# Patient Record
Sex: MALE | Race: WHITE | NOT HISPANIC OR LATINO | URBAN - METROPOLITAN AREA
[De-identification: names, ages, dates, MRNs, and addresses within clinical notes are randomized per-mention and may not be internally consistent; named-entity substitution may affect disease eponyms.]

---

## 2024-09-17 ENCOUNTER — OFFICE VISIT (OUTPATIENT)
Dept: UROLOGY | Facility: CLINIC | Age: 49
End: 2024-09-17
Attending: SPECIALIST

## 2024-09-17 VITALS
WEIGHT: 213.88 LBS | SYSTOLIC BLOOD PRESSURE: 110 MMHG | BODY MASS INDEX: 28.97 KG/M2 | HEIGHT: 72 IN | DIASTOLIC BLOOD PRESSURE: 74 MMHG

## 2024-09-17 DIAGNOSIS — R30.0 DYSURIA: Primary | ICD-10-CM

## 2024-09-17 LAB
BILIRUB SERPL-MCNC: NORMAL MG/DL
BLOOD URINE, POC: NORMAL
CLARITY, POC UA: CLEAR
COLOR, POC UA: YELLOW
GLUCOSE UR QL STRIP: NORMAL
KETONES UR QL STRIP: NORMAL
LEUKOCYTE ESTERASE URINE, POC: NORMAL
NITRITE, POC UA: NORMAL
PH, POC UA: 5.5
PROTEIN, POC: NORMAL
SPECIFIC GRAVITY, POC UA: 1.02
UROBILINOGEN, POC UA: 0.2

## 2024-09-17 PROCEDURE — 81002 URINALYSIS NONAUTO W/O SCOPE: CPT | Mod: PBBFAC | Performed by: SPECIALIST

## 2024-09-17 PROCEDURE — 99205 OFFICE O/P NEW HI 60 MIN: CPT | Mod: S$PBB,,, | Performed by: SPECIALIST

## 2024-09-17 PROCEDURE — 99999PBSHW POCT URINE DIPSTICK WITHOUT MICROSCOPE: Mod: PBBFAC,,,

## 2024-09-17 PROCEDURE — 99212 OFFICE O/P EST SF 10 MIN: CPT | Mod: PBBFAC | Performed by: SPECIALIST

## 2024-09-17 PROCEDURE — 87086 URINE CULTURE/COLONY COUNT: CPT | Performed by: SPECIALIST

## 2024-09-17 PROCEDURE — 99999 PR PBB SHADOW E&M-EST. PATIENT-LVL II: CPT | Mod: PBBFAC,,, | Performed by: SPECIALIST

## 2024-09-17 RX ORDER — LEVOFLOXACIN 500 MG/1
500 TABLET, FILM COATED ORAL DAILY
Qty: 14 TABLET | Refills: 0 | Status: SHIPPED | OUTPATIENT
Start: 2024-09-17

## 2024-09-17 RX ORDER — TAMSULOSIN HYDROCHLORIDE 0.4 MG/1
1 CAPSULE ORAL NIGHTLY
COMMUNITY
Start: 2024-09-06

## 2024-09-17 RX ORDER — MAG HYDROX/ALUMINUM HYD/SIMETH 400-400-40
SUSPENSION, ORAL (FINAL DOSE FORM) ORAL
COMMUNITY

## 2024-09-17 RX ORDER — LYCOPENE 10 MG
CAPSULE ORAL
COMMUNITY

## 2024-09-17 NOTE — PROGRESS NOTES
[facility] - Urology   Clinic Note    SUBJECTIVE:     Chief Complaint   Patient presents with    Dysuria    Testicle Pain     States he first noticed the pain a few months ago, has some pain at the moment.       Referral from: Self, Anyial.    History of Present Illness:  Jorge L Capellan is a 49 y.o. male who presents to clinic for dysuria..    Patient is a very pleasant 49-year-old gentleman self-referred for irritative voiding symptoms.  His symptoms started roughly 2-3 months ago.  He is originally from [place] and saw a urologist there as well as [facility].  In summary he experiences excruciating pain when voiding, particularly at nighttime.  He states that he is monogamous with his wife and does not have a history of STDs.  He denies urethral discharge or pyuria.  He had been empirically treated with two 10 day courses of ciprofloxacin.  He mentions he was empirically treated for prostatitis and or possible epididymitis.  His symptoms abated for a brief period of time.  He has been placed on tamsulosin and was encouraged to increase the dose to 0.8 mg.  Both 0.4 mg and 0.8 mg have not provided much relief.  Until a diagnosis is made for his symptoms I suggested continuing 0.4 mg OD.  He is also taking numerous homeopathic remedies ranging from lycopene an herbal supplements.  I can not comment regarding the efficacy based on the absence of controlled studies.  In addition, he had a scrotal ultrasound performed in [place].  I do not have a copy of the report.  He mentioned he was likely diagnosed with epididymitis on ultrasound, otherwise findings unremarkable.  Otherwise he denies hematuria pyuria flank pain, history of urolithiasis, urinary retention, fever or chills, no history of urethral stricture disease.    Patient endorses no additional complaints at this time.    History reviewed. No pertinent past medical history.    History reviewed. No pertinent surgical history.    No family  "history on file.    Social History     Tobacco Use    Smoking status: Former     Types: Cigarettes    Smokeless tobacco: Never       Current Outpatient Medications on File Prior to Visit   Medication Sig Dispense Refill    lycopene 10 mg Cap Take by mouth.      saw palmetto 450 mg Cap Take by mouth.      tamsulosin (FLOMAX) 0.4 mg Cap Take 1 capsule by mouth every evening.      zinc acetate 50 mg (zinc) Cap Take 50 mg by mouth once daily.       No current facility-administered medications on file prior to visit.       Review of patient's allergies indicates:   Allergen Reactions    Iodine Other (See Comments)       Review of Systems   Constitutional:  Negative for chills and weight loss.   Genitourinary:  Positive for dysuria.        Review of Systems:  A review of 10+ systems was conducted with pertinent positive and negative findings documented in HPI with all other systems reviewed and negative.    OBJECTIVE:     Estimated body mass index is 29 kg/m² as calculated from the following:    Height as of this encounter: 6' (1.829 m).    Weight as of this encounter: 97 kg (213 lb 13.5 oz).    Vital Signs (Most Recent)  Vitals:    09/17/24 1106   BP: 110/74       Physical Exam:    Physical Exam     GENERAL: patient sitting comfortably  HEENT: normocephalic  NECK: supple, no JVD  PULM: normal chest rise, no increased WOB  HEART: non-diaphoretic  ABDO: soft, nondistended, nontender  BACK: no CVA tenderness bilaterally  SKIN: warm, dry, well perfused  EXT: no bruising or edema  NEURO: grossly normal with no focal deficits  PSYCH: appropriate mood and affect    Genitourinary Exam:  Both testes descended normal size nontender no masses no fullness or tenderness of left or right epididymis, normal circumcised male phallus, no lesions, prostate 30 g smooth nontender normal consistency no nodules      LABS:     No results found for: "BUN", "CREATININE", "GFRNONAA", "GFRAA", "WBC", "HGB", "HCT", "PLT", "AST", "ALT", "ALKPHOS", " ""ALBUMIN", "HGBA1C", "PT", "PTT", "INR"     Urinalysis:   No results found for: "UAREFLEX"     PSA:  No results found for: "PSA", "PSADIAG", "PSATOTAL", "PSAFREE"    Testosterone:  No results found for: "TOTALTESTOST", "TESTOSTERONE"     Imaging:  I have personally reviewed all relevant imaging studies.    No results found for this or any previous visit (from the past 2160 hour(s)).  No results found for this or any previous visit (from the past 2160 hour(s)).  No image results found.         ASSESSMENT     1. Dysuria        PLAN:     Differential diagnosis includes but not limited to chronic prostatitis, acute cystitis, interstitial cystitis, urethral stricture, bladder stones, or possible neoplasm.  I have encouraged patient to remain on tamsulosin 0.4 mg a day.  I will empirically treat for chronic prostatitis with another round of a quinolone-Levaquin 500 mg daily x2 weeks.  I have encouraged him to obtain copies of his medical records including his scrotal ultrasound report.  Finally, I would like to proceed with cystoscopy to exclude a urethral stricture, bladder stone or neoplasm.  All questions were answered.    Guanakito Irwin MD  Urology  [facility]     Disclaimer: This note has been generated using voice-recognition software. There may be typographical errors that have been missed during proof-reading.     "

## 2024-09-18 LAB — BACTERIA UR CULT: NO GROWTH

## 2024-09-20 ENCOUNTER — TELEPHONE (OUTPATIENT)
Dept: UROLOGY | Facility: CLINIC | Age: 49
End: 2024-09-20

## 2024-09-20 DIAGNOSIS — R30.0 DYSURIA: Primary | ICD-10-CM

## 2024-09-20 NOTE — TELEPHONE ENCOUNTER
Called patient back per Dr. Irwin to see if we could move his appointment from this morning to this afternoon for more time to discuss issues. States he won't be able to make it in today due to work and would just like for Dr. Irwin to call him to discuss. Notified him that I would forward that over to Dr. Irwin to see if he would reach out to him. V/u. Canceled appointment for today.

## 2024-09-30 ENCOUNTER — TELEPHONE (OUTPATIENT)
Dept: UROLOGY | Facility: CLINIC | Age: 49
End: 2024-09-30

## 2024-09-30 NOTE — TELEPHONE ENCOUNTER
----- Message from Zulema sent at 2024  8:15 AM CDT -----  Contact: PATIENT  Jorge L Capellan  MRN: [id number]  : 1975  PCP: Nikia, Primary Doctor  Home Phone      [phone]  Work Phone      Not on file.  Mobile          [phone]      MESSAGE: Patient states that he received a message regarding a procedure that he has on 10/01/24.  The only thing that is shows is an office visit on 10/01/24.  He says that it is asking him to pay but he would like to know what he is paying for.          Phone: [phone]

## 2024-09-30 NOTE — TELEPHONE ENCOUNTER
Tried calling patient back in regards to his procedure tomorrow. Unable to reach, lvm to return call to clinic.

## 2024-10-01 ENCOUNTER — PROCEDURE VISIT (OUTPATIENT)
Dept: UROLOGY | Facility: CLINIC | Age: 49
End: 2024-10-01
Attending: SPECIALIST

## 2024-10-01 DIAGNOSIS — R30.0 DYSURIA: Primary | ICD-10-CM

## 2024-10-01 PROCEDURE — 52000 CYSTOURETHROSCOPY: CPT | Mod: PBBFAC | Performed by: SPECIALIST

## 2024-10-01 RX ORDER — HYDROXYZINE HYDROCHLORIDE 25 MG/1
25 TABLET, FILM COATED ORAL NIGHTLY
Qty: 90 TABLET | Refills: 5 | Status: SHIPPED | OUTPATIENT
Start: 2024-10-01

## 2024-10-01 RX ORDER — AMITRIPTYLINE HYDROCHLORIDE 25 MG/1
25 TABLET, FILM COATED ORAL NIGHTLY PRN
Qty: 90 TABLET | Refills: 3 | Status: SHIPPED | OUTPATIENT
Start: 2024-10-01 | End: 2025-10-01

## 2024-10-01 NOTE — PROCEDURES
Procedure cystoscopy    Patient was taken to the procedure room and placed on the examining table in a supine position.  His genitalia was prepped and draped in usual sterile fashion.  Informed consent was obtained in writing.  Flexible cystourethroscopy was performed.  There was no evidence of any urethral strictures or erythema.  Upon entering the prostate patient was in excruciating pain.  I did not get a good look of the bladder.  I terminated the procedure.    Assessment and plan:     Patient states that he has been experiencing extreme bladder and perineal pain for 2-3 months.  It has been unbearable for him.  Due to his pain and anxiety he tends to refrain from voiding and squeeze his pelvic floor muscles.  Likewise, he is afraid to develop urinary retention.  He does have some modest relief width tamsulosin however we agreed to try another alpha-blocker.  In summary, I explained that the differential diagnosis includes but not limited to prostatitis, urethritis, interstitial cystitis or chronic pelvic pain syndrome, or urothelial neoplasm.  At this point in time we will empirically treat for interstitial cystitis.  I will prescribe a cocktail of Elmiron Atarax and amitriptyline.  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  We believe the benefits outweigh the risks.  Patient will follow up in roughly 6 weeks.

## 2024-10-14 ENCOUNTER — TELEPHONE (OUTPATIENT)
Dept: UROLOGY | Facility: CLINIC | Age: 49
End: 2024-10-14

## 2024-10-14 DIAGNOSIS — R30.0 DYSURIA: Primary | ICD-10-CM

## 2024-10-14 NOTE — TELEPHONE ENCOUNTER
"----- Message from Guanakito Irwin MD sent at 10/11/2024  5:25 PM CDT -----  Regarding: RE: Medication  Neela,  I recommend the followin. Forward urine sample (off pyridium) to lab for URINE CYTOLOGY  2. RECOMMEND CYSTOSCOPY, POSSIBLE HYDRODISTENTION under anesthesia, he'll obviously need prior auth.  ----- Message -----  From: Romi Gamble MA  Sent: 10/11/2024   2:27 PM CDT  To: Guanakito Irwin MD  Subject: Medication                                       Patient called the clinic upset due to the medication you prescribed was $1000 (couldn't tell me the name), states he's still having pain when he goes to the bathroom. Is taking otc Azo. Also wants you to know that he can't take Hydroxyzine due to it making him "cranky". Would like to know what else you can recommend. Please advise.  "

## 2024-10-14 NOTE — TELEPHONE ENCOUNTER
"Notified patient per Dr. Irwin: 1. Forward urine sample (off pyridium) to lab for URINE CYTOLOGY   2. RECOMMEND CYSTOSCOPY, POSSIBLE HYDRODISTENTION under anesthesia, he'll obviously need prior auth." States he would like to know what the price of the procedure would be first before proceeding with surgery. Stated he isn't sure why no one can figure out what is causing his pain. Would like to have urine sample done at a smaller facility due to pricing. V/u.  "

## 2024-11-12 ENCOUNTER — OFFICE VISIT (OUTPATIENT)
Dept: UROLOGY | Facility: CLINIC | Age: 49
End: 2024-11-12
Attending: SPECIALIST

## 2024-11-12 VITALS — WEIGHT: 213.88 LBS | HEIGHT: 72 IN | BODY MASS INDEX: 28.97 KG/M2

## 2024-11-12 DIAGNOSIS — G89.4 CHRONIC PROSTATITIS/CHRONIC PELVIC PAIN SYNDROME: Primary | ICD-10-CM

## 2024-11-12 DIAGNOSIS — N41.1 CHRONIC PROSTATITIS/CHRONIC PELVIC PAIN SYNDROME: Primary | ICD-10-CM

## 2024-11-12 DIAGNOSIS — N13.8 BPH WITH URINARY OBSTRUCTION: ICD-10-CM

## 2024-11-12 DIAGNOSIS — R30.0 DYSURIA: ICD-10-CM

## 2024-11-12 DIAGNOSIS — N40.1 BPH WITH URINARY OBSTRUCTION: ICD-10-CM

## 2024-11-12 PROCEDURE — 99212 OFFICE O/P EST SF 10 MIN: CPT | Mod: PBBFAC | Performed by: SPECIALIST

## 2024-11-12 PROCEDURE — 99999 PR PBB SHADOW E&M-EST. PATIENT-LVL II: CPT | Mod: PBBFAC,,, | Performed by: SPECIALIST

## 2024-11-12 PROCEDURE — 99215 OFFICE O/P EST HI 40 MIN: CPT | Mod: S$PBB,,, | Performed by: SPECIALIST

## 2024-11-12 RX ORDER — TADALAFIL 5 MG/1
5 TABLET ORAL DAILY
Qty: 30 TABLET | Refills: 5 | Status: SHIPPED | OUTPATIENT
Start: 2024-11-12 | End: 2025-11-12

## 2024-11-12 NOTE — PROGRESS NOTES
[facility] - Urology   Clinic Note    SUBJECTIVE:     Chief Complaint   Patient presents with    Follow-up     States he's still having some of the same issues as before, has noticed about a month ago that his semen is a orange color. Would like to get T levels checked.       Referral from: No ref. provider found.    History of Present Illness:  Jorge L Capellan is a 49 y.o. male who presents to clinic for likely interstitial cystitis.    Patient returns for follow up.  His symptoms are the same-suprapubic pain radiating to his urethra.  He denies hematuria pyuria nausea vomiting fever or chills.  He reports this intense spasm upon micturition.  I previously prescribed Atarax amitriptyline and Elmiron.  He is not sure if he is taking amitriptyline and believes the Atarax causes somnolence.  He said the price of Elmiron was cost prohibitive.  He admits to drinking multiple cups of coffee a day as well as eating hot peppers and spicy foods.  We reviewed a lengthy list of foods to avoid--adhere to a IC diet  His workup has included a CT scan-I do not have a copy of the printed report.  PSA reportedly with a normal limits.  He has not responded to several rounds of antibiotics for prostatitis.  Urine culture was negative.  He claims that the Flomax caused constipation.  He still complains of obstructive voiding symptoms and would like to try Cialis to manage both ED and LUTS.  I have recommended sending a urine for cytology (rule out carcinoma in Situ) as well as considering cystoscopy and hydrodistention, possible bladder biopsy.  Patient is not inclined to proceed as he will be paying out-of-pocket.    History reviewed. No pertinent past medical history.    Current Outpatient Medications on File Prior to Visit   Medication Sig Dispense Refill    amitriptyline (ELAVIL) 25 MG tablet Take 1 tablet (25 mg total) by mouth nightly as needed for Insomnia. 90 tablet 3    hydrOXYzine HCL (ATARAX) 25 MG tablet Take 1  "tablet (25 mg total) by mouth nightly. 90 tablet 5    levoFLOXacin (LEVAQUIN) 500 MG tablet Take 1 tablet (500 mg total) by mouth Daily. 14 tablet 0    lycopene 10 mg Cap Take by mouth.      pentosan polysulfate (ELMIRON) 100 mg Cap Take 1 capsule (100 mg total) by mouth 3 (three) times daily. 90 capsule 3    saw palmetto 450 mg Cap Take by mouth.      tamsulosin (FLOMAX) 0.4 mg Cap Take 1 capsule by mouth every evening.      zinc acetate 50 mg (zinc) Cap Take 50 mg by mouth once daily.       No current facility-administered medications on file prior to visit.         OBJECTIVE:     Estimated body mass index is 29 kg/m² as calculated from the following:    Height as of this encounter: 6' (1.829 m).    Weight as of this encounter: 97 kg (213 lb 13.5 oz).    Vital Signs (Most Recent)  There were no vitals filed for this visit.    Physical Exam:    Physical Exam     GENERAL: patient sitting comfortably  HEENT: normocephalic  NECK: supple, no JVD  PULM: normal chest rise, no increased WOB  HEART: non-diaphoretic  ABDO: soft, nondistended, nontender  BACK: no CVA tenderness bilaterally  SKIN: warm, dry, well perfused  EXT: no bruising or edema  NEURO: grossly normal with no focal deficits  PSYCH: appropriate mood and affect    Genitourinary Exam:  deferred      LABS:     No results found for: "BUN", "CREATININE", "GFRNONAA", "GFRAA", "WBC", "HGB", "HCT", "PLT", "AST", "ALT", "ALKPHOS", "ALBUMIN", "HGBA1C", "PT", "PTT", "INR"     Urinalysis:   No results found for: "UAREFLEX"     PSA:  No results found for: "PSA", "PSADIAG", "PSATOTAL", "PSAFREE"    Testosterone:  No results found for: "TOTALTESTOST", "TESTOSTERONE"     Imaging:  I have personally reviewed all relevant imaging studies.    No results found for this or any previous visit (from the past 2160 hours).  No results found for this or any previous visit (from the past 2160 hours).  No image results found.         ASSESSMENT     1. Chronic prostatitis/chronic " pelvic pain syndrome    2. BPH with urinary obstruction    3. Dysuria        PLAN:     Recommend cystoscopy, possible bladder biopsy, hydrodistention-patient wishes to hold  Adhere to a IC diet--we reviewed several dietary protocols online  Continue Atarax amitriptyline.  Patient was advised to call [employer] for a patient assistance waiver for Elmiron.  Follow up in 6 weeks    Guanakito Irwin MD  Urology  [facility]     Disclaimer: This note has been generated using voice-recognition software. There may be typographical errors that have been missed during proof-reading.

## 2024-11-14 ENCOUNTER — TELEPHONE (OUTPATIENT)
Dept: UROLOGY | Facility: CLINIC | Age: 49
End: 2024-11-14

## 2024-11-14 NOTE — TELEPHONE ENCOUNTER
Received fax from [facility] in regards to patients CT Scan, will place on Dr. Irwin's desk for review.

## 2025-01-03 ENCOUNTER — OFFICE VISIT (OUTPATIENT)
Dept: UROLOGY | Facility: CLINIC | Age: 50
End: 2025-01-03
Attending: SPECIALIST

## 2025-01-03 ENCOUNTER — TELEPHONE (OUTPATIENT)
Dept: UROLOGY | Facility: CLINIC | Age: 50
End: 2025-01-03

## 2025-01-03 DIAGNOSIS — N30.10 INTERSTITIAL CYSTITIS: Primary | ICD-10-CM

## 2025-01-03 PROCEDURE — 99212 OFFICE O/P EST SF 10 MIN: CPT | Mod: PBBFAC | Performed by: SPECIALIST

## 2025-01-03 PROCEDURE — 99999 PR PBB SHADOW E&M-EST. PATIENT-LVL II: CPT | Mod: PBBFAC,,, | Performed by: SPECIALIST

## 2025-01-03 NOTE — PROGRESS NOTES
[facility] - Urology   Clinic Note    SUBJECTIVE:     Chief Complaint   Patient presents with    Follow-up     States he isn't getting better and would like to discuss what to do next. Symptoms are getting worse and is soon getting insurance.        Referral from: No ref. provider found.    History of Present Illness:  Jorge L Capellan is a 49 y.o. male who presents to clinic for interstitial cystitis.    Patient returns for follow up.  He mentions that he just got back from a ski vacation in [facility].  He admits that when he is relaxed and on vacation his symptoms are rather minimal.  He is presently under a little bit of stress looking for new employees at work.  Admittedly, he typically is under a modest amount of stress, but did not respond to amitriptyline.  He said that it made him angry.  After his vacation, he regrets that he does not have the financial means to purchase Elmiron.  I have recommended contacting [employer] for an assistance waiver.  He does have modest relief with Atarax.  He takes 12.5 mg q.h.s..    History reviewed. No pertinent past medical history.    Current Outpatient Medications on File Prior to Visit   Medication Sig Dispense Refill    amitriptyline (ELAVIL) 25 MG tablet Take 1 tablet (25 mg total) by mouth nightly as needed for Insomnia. 90 tablet 3    hydrOXYzine HCL (ATARAX) 25 MG tablet Take 1 tablet (25 mg total) by mouth nightly. 90 tablet 5    levoFLOXacin (LEVAQUIN) 500 MG tablet Take 1 tablet (500 mg total) by mouth Daily. 14 tablet 0    lycopene 10 mg Cap Take by mouth.      pentosan polysulfate (ELMIRON) 100 mg Cap Take 1 capsule (100 mg total) by mouth 3 (three) times daily. 90 capsule 3    saw palmetto 450 mg Cap Take by mouth.      tadalafiL (CIALIS) 5 MG tablet Take 1 tablet (5 mg total) by mouth Daily. 30 tablet 5    tamsulosin (FLOMAX) 0.4 mg Cap Take 1 capsule by mouth every evening.      zinc acetate 50 mg (zinc) Cap Take 50 mg by mouth  "once daily.       No current facility-administered medications on file prior to visit.         OBJECTIVE:     Estimated body mass index is 29 kg/m² as calculated from the following:    Height as of 11/12/24: 6' (1.829 m).    Weight as of 11/12/24: 97 kg (213 lb 13.5 oz).    Vital Signs (Most Recent)  There were no vitals filed for this visit.    Physical Exam:    Physical Exam     GENERAL: patient sitting comfortably  HEENT: normocephalic  NECK: supple, no JVD  PULM: normal chest rise, no increased WOB  HEART: non-diaphoretic  ABDO: soft, nondistended, nontender  BACK: no CVA tenderness bilaterally  SKIN: warm, dry, well perfused  EXT: no bruising or edema  NEURO: grossly normal with no focal deficits  PSYCH: appropriate mood and affect    Genitourinary Exam:  deferred      LABS:     No results found for: "BUN", "CREATININE", "GFRNONAA", "GFRAA", "WBC", "HGB", "HCT", "PLT", "AST", "ALT", "ALKPHOS", "ALBUMIN", "HGBA1C", "PT", "PTT", "INR"     Urinalysis:   No results found for: "UAREFLEX"     PSA:  No results found for: "PSA", "PSADIAG", "PSATOTAL", "PSAFREE"    Testosterone:  No results found for: "TOTALTESTOST", "TESTOSTERONE"     Imaging:  I have personally reviewed all relevant imaging studies.    No results found for this or any previous visit (from the past 2160 hours).  No results found for this or any previous visit (from the past 2160 hours).  No image results found.         ASSESSMENT     1. Interstitial cystitis        PLAN:     Patient advises me that he will seriously consider taking Elmiron.  He is presently doing well with Atarax.  He would still like to defer cystoscopy and hydrodistention and biopsy.  Continue Atarax.  Continue IC diet.  RTC 3 months    Guanakito Irwin MD  Urology  [facility]     Disclaimer: This note has been generated using voice-recognition software. There may be typographical errors that have been missed during proof-reading.     "

## 2025-01-07 ENCOUNTER — TELEPHONE (OUTPATIENT)
Dept: UROLOGY | Facility: CLINIC | Age: 50
End: 2025-01-07

## 2025-01-07 NOTE — TELEPHONE ENCOUNTER
Romi  Please call Margie Timi regarding a cystoscopic exam, biopsy under anesthesia.  He mentioned he was waiting for better insurance coverage, however, he's being treated for IC and I still need to rule out other pathology such as a possible bladder tumor, carcinoma in situ or bladder stone.  Please advise, thanks MS

## 2025-01-08 ENCOUNTER — TELEPHONE (OUTPATIENT)
Dept: UROLOGY | Facility: CLINIC | Age: 50
End: 2025-01-08

## 2025-01-08 NOTE — TELEPHONE ENCOUNTER
Received confirmation letter from [employer] Patient Assistance Program in regards to patients care, will place to be scanned into chart.

## 2025-01-08 NOTE — TELEPHONE ENCOUNTER
"Tried calling patient to notify "Please call Mr. Capellan regarding a cystoscopic exam, biopsy under anesthesia. He mentioned he was waiting for better insurance coverage, however, he's being treated for IC and I still need to rule out other pathology such as a possible bladder tumor, carcinoma in situ or bladder stone. Please advise, thanks MS" phone went straight to [facility] to return call to clinic.  "

## 2025-02-03 ENCOUNTER — HOSPITAL ENCOUNTER (EMERGENCY)
Facility: HOSPITAL | Age: 50
Discharge: HOME | End: 2025-02-04

## 2025-02-03 DIAGNOSIS — R10.9 ABDOMINAL PAIN: Primary | ICD-10-CM

## 2025-02-03 DIAGNOSIS — M87.059 AVASCULAR NECROSIS OF FEMUR, UNSPECIFIED LATERALITY: ICD-10-CM

## 2025-02-03 DIAGNOSIS — N50.819 TESTICLE PAIN: ICD-10-CM

## 2025-02-03 LAB
ALBUMIN SERPL BCP-MCNC: 4.9 G/DL (ref 3.5–5.2)
ALP SERPL-CCNC: 61 U/L (ref 40–150)
ALT SERPL W/O P-5'-P-CCNC: 16 U/L (ref 10–44)
ANION GAP SERPL CALC-SCNC: 11 MMOL/L (ref 8–16)
AST SERPL-CCNC: 18 U/L (ref 10–40)
BASOPHILS # BLD AUTO: 0.04 K/UL (ref 0–0.2)
BASOPHILS NFR BLD: 0.7 % (ref 0–1.9)
BILIRUB SERPL-MCNC: 0.2 MG/DL (ref 0.1–1)
BILIRUB UR QL STRIP: NEGATIVE
BUN SERPL-MCNC: 20 MG/DL (ref 6–20)
CALCIUM SERPL-MCNC: 9.5 MG/DL (ref 8.7–10.5)
CHLORIDE SERPL-SCNC: 108 MMOL/L (ref 95–110)
CLARITY UR: CLEAR
CO2 SERPL-SCNC: 23 MMOL/L (ref 23–29)
COLOR UR: YELLOW
CREAT SERPL-MCNC: 1.3 MG/DL (ref 0.5–1.4)
DIFFERENTIAL METHOD BLD: NORMAL
EOSINOPHIL # BLD AUTO: 0.2 K/UL (ref 0–0.5)
EOSINOPHIL NFR BLD: 3.6 % (ref 0–8)
ERYTHROCYTE [DISTWIDTH] IN BLOOD BY AUTOMATED COUNT: 12.4 % (ref 11.5–14.5)
EST. GFR  (NO RACE VARIABLE): >60 ML/MIN/1.73 M^2
GLUCOSE SERPL-MCNC: 98 MG/DL (ref 70–110)
GLUCOSE UR QL STRIP: NEGATIVE
HCT VFR BLD AUTO: 45.8 % (ref 40–54)
HGB BLD-MCNC: 15.3 G/DL (ref 14–18)
HGB UR QL STRIP: NEGATIVE
IMM GRANULOCYTES # BLD AUTO: 0.02 K/UL (ref 0–0.04)
IMM GRANULOCYTES NFR BLD AUTO: 0.4 % (ref 0–0.5)
KETONES UR QL STRIP: NEGATIVE
LEUKOCYTE ESTERASE UR QL STRIP: NEGATIVE
LIPASE SERPL-CCNC: 38 U/L (ref 4–60)
LYMPHOCYTES # BLD AUTO: 2.5 K/UL (ref 1–4.8)
LYMPHOCYTES NFR BLD: 44.8 % (ref 18–48)
MCH RBC QN AUTO: 30.5 PG (ref 27–31)
MCHC RBC AUTO-ENTMCNC: 33.4 G/DL (ref 32–36)
MCV RBC AUTO: 91 FL (ref 82–98)
MONOCYTES # BLD AUTO: 0.5 K/UL (ref 0.3–1)
MONOCYTES NFR BLD: 9.6 % (ref 4–15)
NEUTROPHILS # BLD AUTO: 2.3 K/UL (ref 1.8–7.7)
NEUTROPHILS NFR BLD: 40.9 % (ref 38–73)
NITRITE UR QL STRIP: NEGATIVE
NRBC BLD-RTO: 0 /100 WBC
PH UR STRIP: 6 [PH] (ref 5–8)
PLATELET # BLD AUTO: 198 K/UL (ref 150–450)
PMV BLD AUTO: 10.4 FL (ref 9.2–12.9)
POTASSIUM SERPL-SCNC: 4.1 MMOL/L (ref 3.5–5.1)
PROT SERPL-MCNC: 8.5 G/DL (ref 6–8.4)
PROT UR QL STRIP: NEGATIVE
RBC # BLD AUTO: 5.01 M/UL (ref 4.6–6.2)
SODIUM SERPL-SCNC: 142 MMOL/L (ref 136–145)
SP GR UR STRIP: 1.02 (ref 1–1.03)
URN SPEC COLLECT METH UR: NORMAL
UROBILINOGEN UR STRIP-ACNC: NEGATIVE EU/DL
WBC # BLD AUTO: 5.62 K/UL (ref 3.9–12.7)

## 2025-02-03 PROCEDURE — 87491 CHLMYD TRACH DNA AMP PROBE: CPT

## 2025-02-03 PROCEDURE — 81003 URINALYSIS AUTO W/O SCOPE: CPT

## 2025-02-03 PROCEDURE — 99900035 HC TECH TIME PER 15 MIN (STAT)

## 2025-02-03 PROCEDURE — 99285 EMERGENCY DEPT VISIT HI MDM: CPT | Mod: 25

## 2025-02-03 PROCEDURE — 85025 COMPLETE CBC W/AUTO DIFF WBC: CPT

## 2025-02-03 PROCEDURE — 93010 ELECTROCARDIOGRAM REPORT: CPT | Mod: ,,, | Performed by: INTERNAL MEDICINE

## 2025-02-03 PROCEDURE — 80053 COMPREHEN METABOLIC PANEL: CPT

## 2025-02-03 PROCEDURE — 83690 ASSAY OF LIPASE: CPT

## 2025-02-03 PROCEDURE — 93005 ELECTROCARDIOGRAM TRACING: CPT

## 2025-02-03 RX ORDER — KETOROLAC TROMETHAMINE 30 MG/ML
15 INJECTION, SOLUTION INTRAMUSCULAR; INTRAVENOUS
Status: DISCONTINUED | OUTPATIENT
Start: 2025-02-03 | End: 2025-02-04 | Stop reason: HOSPADM

## 2025-02-04 VITALS
HEART RATE: 46 BPM | TEMPERATURE: 98 F | BODY MASS INDEX: 28.07 KG/M2 | WEIGHT: 207.25 LBS | DIASTOLIC BLOOD PRESSURE: 89 MMHG | RESPIRATION RATE: 20 BRPM | SYSTOLIC BLOOD PRESSURE: 140 MMHG | HEIGHT: 72 IN | OXYGEN SATURATION: 99 %

## 2025-02-04 LAB
OHS QRS DURATION: 86 MS
OHS QTC CALCULATION: 382 MS

## 2025-02-04 RX ORDER — OXYCODONE HYDROCHLORIDE 5 MG/1
5 TABLET ORAL
Status: DISCONTINUED | OUTPATIENT
Start: 2025-02-04 | End: 2025-02-04 | Stop reason: HOSPADM

## 2025-02-04 NOTE — DISCHARGE INSTRUCTIONS
Thank you for coming to our Emergency Department today!     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    Return to the Emergency Department for symptoms including but not limited to: persistence or worsening of symptoms, shortness of breath or chest pain, inability to drink without vomiting, passing out/fainting/ loss of consciousness, or if you have other concerns.

## 2025-02-04 NOTE — ED NOTES
Discharge instructions, referral and strict return precautions gone over with patient; verbalized understanding.  Patient ambulatory out of ED in stable condition.

## 2025-02-04 NOTE — ED PROVIDER NOTES
Encounter Date: 2/3/2025    Source of History:   Patient and medical record, without language barrier or      Chief complaint:  Abdominal Pain (Pt to ED with c/o abdominal pain, testicular and penile pain; reports currently under the care of Dr. Irwin. )    HPI:  Jorge L Capellan is a 49 y.o. male with interstitial cystitis presenting with chief complaint of abdominal pain.  Patient reports lower suprapubic abdominal pain, penile pain, testicular pain that has been ongoing for months but worsening recently.  He has been with the care of Dr. Shaw and being treated for interstitial cystitis but has not found medications that have worked well for him.  He endorses pain with urination as well as less intense pain at rest.  No hematuria.  Compliant with medications.    This is the extent to the patients complaints today here in the emergency department.    ROS: A review of systems was conducted with pertinent positive and negative findings documented in HPI with all other systems reviewed and negative.  ROS    Review of patient's allergies indicates:   Allergen Reactions    Iodine Other (See Comments)       PMH:  As per HPI and below:  History reviewed. No pertinent past medical history.  History reviewed. No pertinent surgical history.  Social History     Socioeconomic History    Marital status: Single   Tobacco Use    Smoking status: Former     Types: Cigarettes    Smokeless tobacco: Never     Vitals:    BP (!) 140/89 (BP Location: Right arm, Patient Position: Sitting)   Pulse (!) 46   Temp 98.4 °F (36.9 °C) (Oral)   Resp 20   Ht 6' (1.829 m)   Wt 94 kg (207 lb 3.7 oz)   SpO2 99%   BMI 28.11 kg/m²     Physical Exam  Vitals and nursing note reviewed.   Constitutional:       General: He is not in acute distress.     Appearance: Normal appearance. He is not toxic-appearing or diaphoretic.   HENT:      Head: Normocephalic and atraumatic.      Right Ear: External ear normal.      Left Ear: External ear  normal.   Eyes:      General: No scleral icterus.     Conjunctiva/sclera: Conjunctivae normal.   Cardiovascular:      Rate and Rhythm: Normal rate and regular rhythm.   Pulmonary:      Effort: Pulmonary effort is normal. No respiratory distress.      Breath sounds: No stridor.   Abdominal:      General: Abdomen is flat. There is no distension.      Tenderness: There is abdominal tenderness in the suprapubic area.   Musculoskeletal:         General: No swelling.      Cervical back: Normal range of motion and neck supple.   Skin:     General: Skin is dry.      Coloration: Skin is not jaundiced.   Neurological:      Mental Status: He is alert. Mental status is at baseline.   Psychiatric:         Mood and Affect: Mood normal.         Behavior: Behavior normal.       Procedures    Laboratory Studies:  Labs that have been ordered have been independently reviewed and interpreted by myself.  Labs Reviewed   COMPREHENSIVE METABOLIC PANEL - Abnormal       Result Value    Sodium 142      Potassium 4.1      Chloride 108      CO2 23      Glucose 98      BUN 20      Creatinine 1.3      Calcium 9.5      Total Protein 8.5 (*)     Albumin 4.9      Total Bilirubin 0.2      Alkaline Phosphatase 61      AST 18      ALT 16      eGFR >60      Anion Gap 11     CBC W/ AUTO DIFFERENTIAL    WBC 5.62      RBC 5.01      Hemoglobin 15.3      Hematocrit 45.8      MCV 91      MCH 30.5      MCHC 33.4      RDW 12.4      Platelets 198      MPV 10.4      Immature Granulocytes 0.4      Gran # (ANC) 2.3      Immature Grans (Abs) 0.02      Lymph # 2.5      Mono # 0.5      Eos # 0.2      Baso # 0.04      nRBC 0      Gran % 40.9      Lymph % 44.8      Mono % 9.6      Eosinophil % 3.6      Basophil % 0.7      Differential Method Automated     LIPASE    Lipase 38     URINALYSIS, REFLEX TO URINE CULTURE    Specimen UA Urine, Clean Catch      Color, UA Yellow      Appearance, UA Clear      pH, UA 6.0      Specific Gravity, UA 1.020      Protein, UA Negative       Glucose, UA Negative      Ketones, UA Negative      Bilirubin (UA) Negative      Occult Blood UA Negative      Nitrite, UA Negative      Urobilinogen, UA Negative      Leukocytes, UA Negative      Narrative:     Specimen Source->Urine   C. TRACHOMATIS/N. GONORRHOEAE BY AMP DNA     Imaging Results              CT Abdomen Pelvis  Without Contrast (Final result)  Result time 02/03/25 22:25:09      Final result by Alec Higgins MD (02/03/25 22:25:09)                   Impression:      Subtle changes of the femoral heads bilaterally may relate to early changes of avascular necrosis for which clinical and historical correlation is needed.    There is no additional evidence for acute inflammatory or obstructive process of the abdomen or pelvis.      Electronically signed by: Alec Higgins  Date:    02/03/2025  Time:    22:25               Narrative:    EXAMINATION:  CT ABDOMEN PELVIS WITHOUT CONTRAST    CLINICAL HISTORY:  Abdominal pain, acute, nonlocalized;    TECHNIQUE:  Low dose axial images, sagittal and coronal reformations were obtained from the lung bases to the pubic symphysis.  Intravenous contrast and oral contrast was not utilized, this diminishes the sensitivity of the examination.    COMPARISON:  None    FINDINGS:  The visualized lung bases demonstrate minimal motion artifact, appear clear.  The stomach is decompressed.    There is no evidence for pericholecystic or peripancreatic inflammatory change.  When accounting for limitations of the exam there is no evidence for acute process of the liver, gallbladder, pancreas, spleen or adrenal glands.    There is no evidence for ureteral calculus or obstructive uropathy or perinephric inflammatory change bilaterally.  The abdominal aorta appears normal in caliber, atherosclerotic change noted otherwise not optimally evaluated on this noncontrast examination.  The urinary bladder demonstrates mild wall thickening likely due to incomplete distention.  There  is mild prominence of the prostate measuring approximately 3.5 x 4.5 cm on axial imaging.    There is a small fat-density umbilical hernia without bowel involvement.  There is no evidence for small bowel obstructive process.  The appendix is identified, it does not appear inflamed.  There is air and stool noted throughout the colon without evidence for inflammatory or obstructive change of the colon.  There is no evidence for free intraperitoneal air.    There are subtle changes of the femoral heads bilaterally that may relate to early changes of avascular necrosis for which clinical and historical correlation is needed.  The osseous structures demonstrate chronic change.                                       US Scrotum And Testicles (Final result)  Result time 02/03/25 22:25:19      Final result by Alec Higgins MD (02/03/25 22:25:19)                   Impression:      The testicles bilaterally demonstrate appropriate echotexture, demonstrate vascular flow on Doppler imaging.    Left-sided varicocele.    Left epididymal cysts measuring up to approximately 5.7 mm.      Electronically signed by: Alec Higgins  Date:    02/03/2025  Time:    22:25               Narrative:    EXAMINATION:  US SCROTUM AND TESTICLES    CLINICAL HISTORY:  Testicular pain, unspecified    TECHNIQUE:  Sonography of the scrotum and testes.    COMPARISON:  None.    FINDINGS:  The right testicle measures approximately 3.5 x 2.4 x 3.2 cm in size.  The right testicle demonstrates appropriate echotexture, there is no evidence for testicular mass or cystic lesion.  Doppler imaging demonstrates vascular flow to the right testicle.  Mild peritesticular fluid noted, this may be physiologic.  The right epididymis appears unremarkable.    The left testicle measures approximately 2.3 x 3.1 x 3.5 cm in size, it demonstrates appropriate echotexture, there is no evidence for testicular mass or cystic lesion.  Doppler imaging demonstrates vascular flow  to the left testicle.  Minimal peritesticular fluid may be physiologic.  There is a hypoechoic finding of the left epididymis measuring approximately 5.7 x 4.9 x 4.5 cm in size, additional finding measuring approximately 4.8 x 2.6 x 4.6 cm in size, these likely represent epididymal cysts.  The left epididymis otherwise appears appropriate.  There is appearance of a varicocele on the left.                                      I decided to obtain the patient's medical records.  Summary of Medical Records:    Medications - No data to display  MDM:    49 y.o. male with lower abdominal pain    Differential Dx:  Differential includes but is not limited to interstitial cystitis flare, UTI, less likely testicular torsion    ED Management:  Abdominal pain workup started for acute presentation of an emergent condition.  Analgesia was offered for symptomatic treatment but patient declined.  CT abdomen and pelvis showing possible early avascular necrosis, patient does not have any pain in his hips but he was informed of these findings and given orthopedic referral for further assessment.  Scrotum ultrasound showing left-sided varicocele and epididymal cyst but no signs of infection or torsion.  Urinalysis completely normal.  Basic labs unremarkable.  Patient is likely experiencing a flare of his interstitial cystitis.  I discussed the need for follow up with the patient.  States he will follow up with Urology.  Discussed results, diagnosis, and treatment plan with patient; advised close follow-up with PCP. Reviewed strict return precautions. Patient confirms understanding and ability to comply. Patient was given the opportunity to ask questions prior to discharge and all questions answered.     Medical Decision Making  Amount and/or Complexity of Data Reviewed  Labs: ordered.  Radiology: ordered.            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

## 2025-02-05 ENCOUNTER — TELEPHONE (OUTPATIENT)
Dept: UROLOGY | Facility: CLINIC | Age: 50
End: 2025-02-05

## 2025-02-05 NOTE — TELEPHONE ENCOUNTER
----- Message from Zulema sent at 2025  4:28 PM CST -----  Contact: PATIENT  Jorge L Capellan  MRN: [id number]  : 1975  PCP: Nikia, Primary Doctor  Home Phone      [phone]  Work Phone      Not on file.  Mobile          [phone]      MESSAGE: Patient states that he was seen in the ER last night for testicular pain and has some questions for Dr. Irwin.          Phone: [phone]

## 2025-02-05 NOTE — TELEPHONE ENCOUNTER
Was in the ER for Testicle pain, states that he has been looking into DMSO and for IC and would like to know if this is something that you recommend and would be willing to do for him. Notified him that I would send message over to MD and call him back once I get a response. States that he has no good quality of life due to this and has to get up 3 to 4 times a night to urinate. Has a lot of stomach pain where he feels bloated all the time.

## 2025-02-07 LAB
C TRACH DNA SPEC QL NAA+PROBE: NOT DETECTED
N GONORRHOEA DNA SPEC QL NAA+PROBE: NOT DETECTED

## 2025-04-08 ENCOUNTER — TELEPHONE (OUTPATIENT)
Dept: FAMILY MEDICINE | Facility: CLINIC | Age: 50
End: 2025-04-08

## 2025-04-08 NOTE — TELEPHONE ENCOUNTER
----- Message from Jorge Alberto sent at 4/7/2025  2:10 PM CDT -----  Contact: Patient  Jorge L MolinaN: [id number]: 1975PCP: No, Primary DoctorHome Phone      [phone] Phone      Not on file.Mobile          [phone]: new patient -- via voicemail -  states he has seen 6 dr's & none of them has been able to help him -- requesting to speak with Dr Ellis before making an appt & wasting the dr's time & his money Call [phone]: Caleb

## 2025-04-08 NOTE — TELEPHONE ENCOUNTER
Contacted/spoke to pt. Advised he will need to be seen to establish care in order to determine if concerns can be addressed. Advised pt provider is a family medicine provider and is needing to see a certain speciality, pt can be referred. Pt declined to schedule appt due to self pay cost.

## 2025-05-12 ENCOUNTER — TELEPHONE (OUTPATIENT)
Dept: UROLOGY | Facility: CLINIC | Age: 50
End: 2025-05-12

## 2025-05-12 NOTE — TELEPHONE ENCOUNTER
----- Message from Zulema sent at 5/12/2025 11:26 AM CDT -----  Contact: PATIENT  Jorge L MolinaN: [id number]: 1975PCP: No, Primary DoctorHome Phone      [phone] Phone      Not on file.Mobile          [phone]: Patient would like to know if Dr. Irwin can call him on the phone to discuss the ongoing problems that he is having.  He says that it takes him an hour & 1/2 to come in for an in person appointment.Phone: [phone]

## 2025-05-12 NOTE — TELEPHONE ENCOUNTER
----- Message from Zulema sent at 5/12/2025 11:26 AM CDT -----  Contact: PATIENT  Jorge L MolinaN: [id number]: 1975PCP: No, Primary DoctorHome Phone      [phone] Phone      Not on file.Mobile          [phone]: Patient would like to know if Dr. Irwin can call him on the phone to discuss the ongoing problems that he is having.  He says that it takes him an hour & 1/2 to come in for an in person appointment.Phone: [phone]

## 2025-05-12 NOTE — TELEPHONE ENCOUNTER
Tried returning call to patient, unable to reach. Phone went straight to . Lvm notifying him that I would see if MD would be willing to do a phone call but can't guarantee that he will. Left clinic number to call back if he has any questions.

## 2025-05-12 NOTE — TELEPHONE ENCOUNTER
Notified MD of patient wanting to speak with him via phone call, MD states he will give patient a call. V/u.

## 2025-05-13 ENCOUNTER — TELEPHONE (OUTPATIENT)
Dept: UROLOGY | Facility: CLINIC | Age: 50
End: 2025-05-13

## 2025-05-13 DIAGNOSIS — N30.10 INTERSTITIAL CYSTITIS: Primary | ICD-10-CM

## 2025-05-13 NOTE — TELEPHONE ENCOUNTER
Mr. Capellan called regarding persistent IC symptoms.    I spoke with him today.  He did not try Elmiron.  (He mentions it's very expensive, and he's trying to get this from [place]).  He still needs cystoscopy (under anesthesia) and a urine for cytology.  In summary, due to the persistence and severity of his symptoms, I have recommended a referral for BOTOX.  He's in agreement (and willing to pay out of pocket for these expenses if need be).  I will place a referral in EMR today.

## 2025-08-20 ENCOUNTER — NURSE TRIAGE (OUTPATIENT)
Dept: ADMINISTRATIVE | Facility: CLINIC | Age: 50
End: 2025-08-20

## 2025-08-21 PROBLEM — Z12.11 SCREEN FOR COLON CANCER: Status: ACTIVE | Noted: 2025-08-21
